# Patient Record
Sex: FEMALE | Race: WHITE | Employment: FULL TIME | URBAN - METROPOLITAN AREA
[De-identification: names, ages, dates, MRNs, and addresses within clinical notes are randomized per-mention and may not be internally consistent; named-entity substitution may affect disease eponyms.]

---

## 2018-05-24 ENCOUNTER — TRANSFERRED RECORDS (OUTPATIENT)
Dept: HEALTH INFORMATION MANAGEMENT | Facility: CLINIC | Age: 63
End: 2018-05-24

## 2019-07-06 ENCOUNTER — HOSPITAL ENCOUNTER (EMERGENCY)
Facility: CLINIC | Age: 64
Discharge: HOME | End: 2019-07-06
Attending: EMERGENCY MEDICINE | Admitting: EMERGENCY MEDICINE
Payer: COMMERCIAL

## 2019-07-06 VITALS
TEMPERATURE: 98.4 F | HEIGHT: 67 IN | HEART RATE: 73 BPM | SYSTOLIC BLOOD PRESSURE: 155 MMHG | RESPIRATION RATE: 16 BRPM | OXYGEN SATURATION: 99 % | WEIGHT: 180 LBS | BODY MASS INDEX: 28.25 KG/M2 | DIASTOLIC BLOOD PRESSURE: 124 MMHG

## 2019-07-06 DIAGNOSIS — T78.40XA ALLERGIC REACTION, INITIAL ENCOUNTER: ICD-10-CM

## 2019-07-06 DIAGNOSIS — L03.113 CELLULITIS OF RIGHT UPPER EXTREMITY: ICD-10-CM

## 2019-07-06 PROCEDURE — 99284 EMERGENCY DEPT VISIT MOD MDM: CPT | Mod: 25

## 2019-07-06 PROCEDURE — 96374 THER/PROPH/DIAG INJ IV PUSH: CPT

## 2019-07-06 PROCEDURE — 25000132 ZZH RX MED GY IP 250 OP 250 PS 637: Performed by: EMERGENCY MEDICINE

## 2019-07-06 PROCEDURE — 25000128 H RX IP 250 OP 636: Performed by: EMERGENCY MEDICINE

## 2019-07-06 RX ORDER — CEPHALEXIN 500 MG/1
500 CAPSULE ORAL 4 TIMES DAILY
Qty: 28 CAPSULE | Refills: 0 | Status: SHIPPED | OUTPATIENT
Start: 2019-07-06 | End: 2019-09-04

## 2019-07-06 RX ORDER — FAMOTIDINE 20 MG/1
20 TABLET, FILM COATED ORAL 2 TIMES DAILY PRN
Qty: 20 TABLET | Refills: 0 | Status: SHIPPED | OUTPATIENT
Start: 2019-07-06 | End: 2019-09-04

## 2019-07-06 RX ORDER — DEXAMETHASONE SODIUM PHOSPHATE 10 MG/ML
10 INJECTION, SOLUTION INTRAMUSCULAR; INTRAVENOUS ONCE
Status: COMPLETED | OUTPATIENT
Start: 2019-07-06 | End: 2019-07-06

## 2019-07-06 RX ORDER — DIPHENHYDRAMINE HCL 25 MG
50 CAPSULE ORAL ONCE
Status: COMPLETED | OUTPATIENT
Start: 2019-07-06 | End: 2019-07-06

## 2019-07-06 RX ORDER — FAMOTIDINE 20 MG/1
20 TABLET, FILM COATED ORAL ONCE
Status: COMPLETED | OUTPATIENT
Start: 2019-07-06 | End: 2019-07-06

## 2019-07-06 RX ORDER — CEPHALEXIN 500 MG/1
500 CAPSULE ORAL ONCE
Status: COMPLETED | OUTPATIENT
Start: 2019-07-06 | End: 2019-07-06

## 2019-07-06 RX ADMIN — DIPHENHYDRAMINE HYDROCHLORIDE 50 MG: 25 CAPSULE ORAL at 22:37

## 2019-07-06 RX ADMIN — FAMOTIDINE 20 MG: 20 TABLET, FILM COATED ORAL at 23:18

## 2019-07-06 RX ADMIN — DEXAMETHASONE SODIUM PHOSPHATE 10 MG: 10 INJECTION, SOLUTION INTRAMUSCULAR; INTRAVENOUS at 22:43

## 2019-07-06 RX ADMIN — CEPHALEXIN 500 MG: 500 CAPSULE ORAL at 22:37

## 2019-07-06 ASSESSMENT — ENCOUNTER SYMPTOMS
MYALGIAS: 1
FEVER: 0
WOUND: 1
COLOR CHANGE: 1
JOINT SWELLING: 1

## 2019-07-06 ASSESSMENT — MIFFLIN-ST. JEOR: SCORE: 1409.1

## 2019-07-06 NOTE — ED AVS SNAPSHOT
Emergency Department  [address] [facility] [address]  Phone:  [phone]  Fax:  [phone]                                    Stephanie Jung   MRN: [id number]    Department:   Emergency Department   Date of Visit:  7/6/2019           After Visit Summary Signature Page    I have received my discharge instructions, and my questions have been answered. I have discussed any challenges I see with this plan with the nurse or doctor.    ..........................................................................................................................................  Patient/Patient Representative Signature      ..........................................................................................................................................  Patient Representative Print Name and Relationship to Patient    ..................................................               ................................................  Date                                   Time    ..........................................................................................................................................  Reviewed by Signature/Title    ...................................................              ..............................................  Date                                               Time          22EPIC Rev 08/18

## 2019-07-07 NOTE — ED TRIAGE NOTES
Patient got stung by a wasp yesterday on right arm several times. States bite is increased in size and is draining. Redness has grown through out the day. States arm is tingly and feels hot.

## 2019-07-07 NOTE — ED PROVIDER NOTES
"  History     Chief Complaint:  Wasp sting    HPI   Stephanie Jung is a 63 year old female who presents to the emergency department for evaluation of multiple wasp sting. The patient indicates she was cleaning dead leaves out of a hedge yesterday when she was stung multiple times on the inside of her upper right arm by wasps. Since the incident her arm has become increasingly swollen, turned red and the redness is progressing up her arm, and is hot to touch. She also notes the sting area is painful and itchy, and she has experienced tingling and soreness in her left arm. The patient notes there has been clear fluid leaking from the wound. She denies fever since the incident. The patient indicates she did not see a stinger in the wound. She notes she has been icing and elevating and took a Claritin D this afternoon with no improvement of symptoms.     Allergies:  No known drug allergies     Medications:    The patient is not currently taking any prescribed medications.     Past Medical History:    History reviewed. The patient does not have any past pertinent medical history.    Past Surgical History:    CLOTILDE and BSO    Family History:    Heart disease  Breast cancer    Social History:  Smoking status: Never  Alcohol use: Negative  Patient presents with .  Marital Status:        Review of Systems   Constitutional: Negative for fever.   Musculoskeletal: Positive for joint swelling and myalgias.   Skin: Positive for color change and wound.   All other systems reviewed and are negative.      Physical Exam     Patient Vitals for the past 24 hrs:   BP Temp Temp src Pulse Resp SpO2 Height Weight   07/06/19 2218 (!) 155/124 98.4  F (36.9  C) Oral 73 16 99 % 1.702 m (5' 7\") 81.6 kg (180 lb)      Physical Exam   General: Patient in mild distress.  Alert and cooperative with exam. Normal mentation.  Well-appearing nontoxic  HEENT: NC/AT. Conjunctiva without injection or scleral icterus. External ears " normal.  Respiratory: Breathing comfortably on room air  CV: Normal rate, all extremities well perfused  GI:  Non-distended abdomen  Skin: Right upper extremity: CMS intact.  Tenderness, warmth, erythema to the arm as pictured below with 3 separate insect sting locations identified without stinger present.  Weeping from the medial elbow.  Compartments soft and compressible.  Musculoskeletal: No obvious deformities  Neuro: Alert, answers questions appropriately. No gross motor deficits              Emergency Department Course     Interventions:  2237 Benadryl 50 mg PO   Keflex 500 mg PO  2243 Decadron 10 mg IV  2318 Pepcid 20 mg PO    Emergency Department Course:  Nursing notes and vitals reviewed. 2038 I performed an exam of the patient as documented above.     2045 I performed  Bedside ultrasound.     IV inserted. Medicine administered as documented above.     2315 I rechecked the patient and discussed the results of her workup thus far.     Findings and plan explained to the Patient and spouse. Patient discharged home with instructions regarding supportive care, medications, and reasons to return. The importance of close follow-up was reviewed. The patient was prescribed Keflex and Pepcid.     Impression & Plan      Medical Decision Making:  Patient is a 63-year-old female who presents with right upper extremity pain and swelling as well as warmth and erythema following multiple wasp stings.  Patient's medical history and records were reviewed.  On evaluation presentation is consistent with infected insect bite.  As patient did note some associated itching and inflammatory changes consistent with allergic reaction, she was provided Decadron, Benadryl, and Pepcid in the ED.  Cellulitic area was outlined, and she was initiated on Keflex; first dose provided in the ED.  No indication for further labs or imaging at this time, though informal bedside ultrasound was performed and shows no evidence of abscess.  Did  recommend continued use of Benadryl and Pepcid as well as cold compresses.  Patient will follow-up with PCP in 2 days for reevaluation.  Return precautions discussed.  Patient discharged home.    Diagnosis:    ICD-10-CM    1. Cellulitis of right upper extremity L03.113    2. Allergic reaction, initial encounter T78.40XA      Disposition:  Discharged to home.    Discharge Medications:     Medication List      Started    cephALEXin 500 MG capsule  Commonly known as:  KEFLEX  500 mg, Oral, 4 TIMES DAILY     famotidine 20 MG tablet  Commonly known as:  PEPCID  20 mg, Oral, 2 TIMES DAILY PRN          Keerthi CASTANO, mike serving as a scribe on 7/6/2019 at 10:26 PM to personally document services performed by Luis Hogan DO based on my observations and the provider's statements to me.      Cruzito Downs  7/6/2019    EMERGENCY DEPARTMENT       Luis Hogan DO  07/07/19 1157

## 2019-09-04 ENCOUNTER — APPOINTMENT (OUTPATIENT)
Dept: GENERAL RADIOLOGY | Facility: CLINIC | Age: 64
End: 2019-09-04
Attending: NURSE PRACTITIONER
Payer: COMMERCIAL

## 2019-09-04 ENCOUNTER — HOSPITAL ENCOUNTER (OUTPATIENT)
Facility: CLINIC | Age: 64
Setting detail: OBSERVATION
Discharge: HOME | End: 2019-09-05
Attending: NURSE PRACTITIONER | Admitting: INTERNAL MEDICINE
Payer: COMMERCIAL

## 2019-09-04 DIAGNOSIS — R07.9 CHEST PAIN: ICD-10-CM

## 2019-09-04 LAB
ANION GAP SERPL CALCULATED.3IONS-SCNC: 7 MMOL/L (ref 3–14)
BASOPHILS # BLD AUTO: 0 10E9/L (ref 0–0.2)
BASOPHILS NFR BLD AUTO: 0.4 %
BUN SERPL-MCNC: 19 MG/DL (ref 7–30)
CALCIUM SERPL-MCNC: 8.3 MG/DL (ref 8.5–10.1)
CHLORIDE SERPL-SCNC: 105 MMOL/L (ref 94–109)
CO2 SERPL-SCNC: 27 MMOL/L (ref 20–32)
CREAT SERPL-MCNC: 1.21 MG/DL (ref 0.52–1.04)
D DIMER PPP FEU-MCNC: 0.3 UG/ML FEU (ref 0–0.5)
DIFFERENTIAL METHOD BLD: NORMAL
EOSINOPHIL # BLD AUTO: 0.1 10E9/L (ref 0–0.7)
EOSINOPHIL NFR BLD AUTO: 1.9 %
ERYTHROCYTE [DISTWIDTH] IN BLOOD BY AUTOMATED COUNT: 12.8 % (ref 10–15)
GFR SERPL CREATININE-BSD FRML MDRD: 48 ML/MIN/{1.73_M2}
GLUCOSE SERPL-MCNC: 92 MG/DL (ref 70–99)
HCT VFR BLD AUTO: 39.1 % (ref 35–47)
HGB BLD-MCNC: 13.1 G/DL (ref 11.7–15.7)
IMM GRANULOCYTES # BLD: 0 10E9/L (ref 0–0.4)
IMM GRANULOCYTES NFR BLD: 0.4 %
INTERPRETATION ECG - MUSE: NORMAL
LYMPHOCYTES # BLD AUTO: 2.4 10E9/L (ref 0.8–5.3)
LYMPHOCYTES NFR BLD AUTO: 34.5 %
MCH RBC QN AUTO: 30.8 PG (ref 26.5–33)
MCHC RBC AUTO-ENTMCNC: 33.5 G/DL (ref 31.5–36.5)
MCV RBC AUTO: 92 FL (ref 78–100)
MONOCYTES # BLD AUTO: 0.6 10E9/L (ref 0–1.3)
MONOCYTES NFR BLD AUTO: 8 %
NEUTROPHILS # BLD AUTO: 3.8 10E9/L (ref 1.6–8.3)
NEUTROPHILS NFR BLD AUTO: 54.8 %
NRBC # BLD AUTO: 0 10*3/UL
NRBC BLD AUTO-RTO: 0 /100
PLATELET # BLD AUTO: 228 10E9/L (ref 150–450)
POTASSIUM SERPL-SCNC: 3.9 MMOL/L (ref 3.4–5.3)
RBC # BLD AUTO: 4.25 10E12/L (ref 3.8–5.2)
SODIUM SERPL-SCNC: 139 MMOL/L (ref 133–144)
TROPONIN I SERPL-MCNC: <0.015 UG/L (ref 0–0.04)
WBC # BLD AUTO: 7 10E9/L (ref 4–11)

## 2019-09-04 PROCEDURE — 99285 EMERGENCY DEPT VISIT HI MDM: CPT | Mod: 25

## 2019-09-04 PROCEDURE — 80048 BASIC METABOLIC PNL TOTAL CA: CPT | Performed by: EMERGENCY MEDICINE

## 2019-09-04 PROCEDURE — 25000132 ZZH RX MED GY IP 250 OP 250 PS 637: Performed by: NURSE PRACTITIONER

## 2019-09-04 PROCEDURE — 36415 COLL VENOUS BLD VENIPUNCTURE: CPT | Performed by: INTERNAL MEDICINE

## 2019-09-04 PROCEDURE — 85379 FIBRIN DEGRADATION QUANT: CPT | Performed by: EMERGENCY MEDICINE

## 2019-09-04 PROCEDURE — 71046 X-RAY EXAM CHEST 2 VIEWS: CPT

## 2019-09-04 PROCEDURE — 85025 COMPLETE CBC W/AUTO DIFF WBC: CPT | Performed by: EMERGENCY MEDICINE

## 2019-09-04 PROCEDURE — 93005 ELECTROCARDIOGRAM TRACING: CPT

## 2019-09-04 PROCEDURE — 84484 ASSAY OF TROPONIN QUANT: CPT | Performed by: EMERGENCY MEDICINE

## 2019-09-04 PROCEDURE — G0378 HOSPITAL OBSERVATION PER HR: HCPCS

## 2019-09-04 PROCEDURE — 84484 ASSAY OF TROPONIN QUANT: CPT | Performed by: INTERNAL MEDICINE

## 2019-09-04 PROCEDURE — 99220 ZZC INITIAL OBSERVATION CARE,LEVL III: CPT | Performed by: INTERNAL MEDICINE

## 2019-09-04 RX ORDER — ACETAMINOPHEN 650 MG/1
650 SUPPOSITORY RECTAL EVERY 4 HOURS PRN
Status: DISCONTINUED | OUTPATIENT
Start: 2019-09-04 | End: 2019-09-05 | Stop reason: HOSPADM

## 2019-09-04 RX ORDER — PROCHLORPERAZINE MALEATE 10 MG
10 TABLET ORAL EVERY 6 HOURS PRN
Status: DISCONTINUED | OUTPATIENT
Start: 2019-09-04 | End: 2019-09-05 | Stop reason: HOSPADM

## 2019-09-04 RX ORDER — ASPIRIN 81 MG/1
324 TABLET, CHEWABLE ORAL ONCE
Status: COMPLETED | OUTPATIENT
Start: 2019-09-04 | End: 2019-09-04

## 2019-09-04 RX ORDER — AMOXICILLIN 250 MG
2 CAPSULE ORAL 2 TIMES DAILY PRN
Status: DISCONTINUED | OUTPATIENT
Start: 2019-09-04 | End: 2019-09-05 | Stop reason: HOSPADM

## 2019-09-04 RX ORDER — LOSARTAN POTASSIUM 50 MG/1
100 TABLET ORAL AT BEDTIME
COMMUNITY

## 2019-09-04 RX ORDER — LABETALOL HYDROCHLORIDE 5 MG/ML
10 INJECTION, SOLUTION INTRAVENOUS EVERY 6 HOURS PRN
Status: DISCONTINUED | OUTPATIENT
Start: 2019-09-04 | End: 2019-09-05 | Stop reason: HOSPADM

## 2019-09-04 RX ORDER — ACETAMINOPHEN 325 MG/1
650 TABLET ORAL EVERY 4 HOURS PRN
Status: DISCONTINUED | OUTPATIENT
Start: 2019-09-04 | End: 2019-09-05 | Stop reason: HOSPADM

## 2019-09-04 RX ORDER — ONDANSETRON 4 MG/1
4 TABLET, ORALLY DISINTEGRATING ORAL EVERY 6 HOURS PRN
Status: DISCONTINUED | OUTPATIENT
Start: 2019-09-04 | End: 2019-09-05 | Stop reason: HOSPADM

## 2019-09-04 RX ORDER — ASPIRIN 81 MG/1
81 TABLET ORAL DAILY
Status: DISCONTINUED | OUTPATIENT
Start: 2019-09-05 | End: 2019-09-05 | Stop reason: HOSPADM

## 2019-09-04 RX ORDER — MULTIPLE VITAMINS W/ MINERALS TAB 9MG-400MCG
1 TAB ORAL DAILY
COMMUNITY

## 2019-09-04 RX ORDER — SODIUM CHLORIDE 9 MG/ML
INJECTION, SOLUTION INTRAVENOUS CONTINUOUS
Status: ACTIVE | OUTPATIENT
Start: 2019-09-04 | End: 2019-09-05

## 2019-09-04 RX ORDER — NITROGLYCERIN 0.4 MG/1
0.4 TABLET SUBLINGUAL
Status: DISCONTINUED | OUTPATIENT
Start: 2019-09-04 | End: 2019-09-05 | Stop reason: HOSPADM

## 2019-09-04 RX ORDER — NALOXONE HYDROCHLORIDE 0.4 MG/ML
.1-.4 INJECTION, SOLUTION INTRAMUSCULAR; INTRAVENOUS; SUBCUTANEOUS
Status: DISCONTINUED | OUTPATIENT
Start: 2019-09-04 | End: 2019-09-05 | Stop reason: HOSPADM

## 2019-09-04 RX ORDER — NITROGLYCERIN 0.4 MG/1
0.4 TABLET SUBLINGUAL EVERY 5 MIN PRN
Status: DISCONTINUED | OUTPATIENT
Start: 2019-09-04 | End: 2019-09-05 | Stop reason: HOSPADM

## 2019-09-04 RX ORDER — ONDANSETRON 2 MG/ML
4 INJECTION INTRAMUSCULAR; INTRAVENOUS EVERY 6 HOURS PRN
Status: DISCONTINUED | OUTPATIENT
Start: 2019-09-04 | End: 2019-09-05 | Stop reason: HOSPADM

## 2019-09-04 RX ORDER — PROCHLORPERAZINE 25 MG
25 SUPPOSITORY, RECTAL RECTAL EVERY 12 HOURS PRN
Status: DISCONTINUED | OUTPATIENT
Start: 2019-09-04 | End: 2019-09-05 | Stop reason: HOSPADM

## 2019-09-04 RX ORDER — POLYETHYLENE GLYCOL 3350 17 G/17G
17 POWDER, FOR SOLUTION ORAL DAILY PRN
Status: DISCONTINUED | OUTPATIENT
Start: 2019-09-04 | End: 2019-09-05 | Stop reason: HOSPADM

## 2019-09-04 RX ORDER — HYDRALAZINE HYDROCHLORIDE 20 MG/ML
10 INJECTION INTRAMUSCULAR; INTRAVENOUS EVERY 6 HOURS PRN
Status: DISCONTINUED | OUTPATIENT
Start: 2019-09-04 | End: 2019-09-05 | Stop reason: HOSPADM

## 2019-09-04 RX ORDER — LIDOCAINE 40 MG/G
CREAM TOPICAL
Status: DISCONTINUED | OUTPATIENT
Start: 2019-09-04 | End: 2019-09-05 | Stop reason: HOSPADM

## 2019-09-04 RX ORDER — CHLORAL HYDRATE 500 MG
1 CAPSULE ORAL DAILY
COMMUNITY

## 2019-09-04 RX ORDER — AMOXICILLIN 250 MG
1 CAPSULE ORAL 2 TIMES DAILY PRN
Status: DISCONTINUED | OUTPATIENT
Start: 2019-09-04 | End: 2019-09-05 | Stop reason: HOSPADM

## 2019-09-04 RX ORDER — METOPROLOL TARTRATE 25 MG/1
50-100 TABLET, FILM COATED ORAL
Status: CANCELLED | OUTPATIENT
Start: 2019-09-04

## 2019-09-04 RX ADMIN — ASPIRIN 81 MG 324 MG: 81 TABLET ORAL at 19:25

## 2019-09-04 ASSESSMENT — ENCOUNTER SYMPTOMS
NAUSEA: 0
DIAPHORESIS: 1
VOMITING: 0
LIGHT-HEADEDNESS: 1

## 2019-09-05 ENCOUNTER — APPOINTMENT (OUTPATIENT)
Dept: CARDIOLOGY | Facility: CLINIC | Age: 64
End: 2019-09-05
Attending: INTERNAL MEDICINE
Payer: COMMERCIAL

## 2019-09-05 VITALS
TEMPERATURE: 96.8 F | OXYGEN SATURATION: 96 % | HEART RATE: 66 BPM | SYSTOLIC BLOOD PRESSURE: 135 MMHG | RESPIRATION RATE: 16 BRPM | BODY MASS INDEX: 29.05 KG/M2 | DIASTOLIC BLOOD PRESSURE: 69 MMHG | HEIGHT: 66 IN

## 2019-09-05 LAB
ANION GAP SERPL CALCULATED.3IONS-SCNC: 1 MMOL/L (ref 3–14)
BASOPHILS # BLD AUTO: 0 10E9/L (ref 0–0.2)
BASOPHILS NFR BLD AUTO: 0.3 %
BUN SERPL-MCNC: 16 MG/DL (ref 7–30)
CALCIUM SERPL-MCNC: 8.2 MG/DL (ref 8.5–10.1)
CHLORIDE SERPL-SCNC: 109 MMOL/L (ref 94–109)
CHOLEST SERPL-MCNC: 183 MG/DL
CO2 SERPL-SCNC: 30 MMOL/L (ref 20–32)
CREAT SERPL-MCNC: 1.03 MG/DL (ref 0.52–1.04)
DIFFERENTIAL METHOD BLD: NORMAL
EOSINOPHIL # BLD AUTO: 0.1 10E9/L (ref 0–0.7)
EOSINOPHIL NFR BLD AUTO: 2.4 %
ERYTHROCYTE [DISTWIDTH] IN BLOOD BY AUTOMATED COUNT: 12.7 % (ref 10–15)
GFR SERPL CREATININE-BSD FRML MDRD: 58 ML/MIN/{1.73_M2}
GLUCOSE SERPL-MCNC: 95 MG/DL (ref 70–99)
HCT VFR BLD AUTO: 36.5 % (ref 35–47)
HDLC SERPL-MCNC: 67 MG/DL
HGB BLD-MCNC: 12.1 G/DL (ref 11.7–15.7)
IMM GRANULOCYTES # BLD: 0 10E9/L (ref 0–0.4)
IMM GRANULOCYTES NFR BLD: 0.2 %
LDLC SERPL CALC-MCNC: 98 MG/DL
LYMPHOCYTES # BLD AUTO: 2.2 10E9/L (ref 0.8–5.3)
LYMPHOCYTES NFR BLD AUTO: 38.3 %
MCH RBC QN AUTO: 30.6 PG (ref 26.5–33)
MCHC RBC AUTO-ENTMCNC: 33.2 G/DL (ref 31.5–36.5)
MCV RBC AUTO: 92 FL (ref 78–100)
MONOCYTES # BLD AUTO: 0.5 10E9/L (ref 0–1.3)
MONOCYTES NFR BLD AUTO: 8 %
NEUTROPHILS # BLD AUTO: 2.9 10E9/L (ref 1.6–8.3)
NEUTROPHILS NFR BLD AUTO: 50.8 %
NONHDLC SERPL-MCNC: 116 MG/DL
NRBC # BLD AUTO: 0 10*3/UL
NRBC BLD AUTO-RTO: 0 /100
PLATELET # BLD AUTO: 197 10E9/L (ref 150–450)
POTASSIUM SERPL-SCNC: 4 MMOL/L (ref 3.4–5.3)
RBC # BLD AUTO: 3.96 10E12/L (ref 3.8–5.2)
SODIUM SERPL-SCNC: 140 MMOL/L (ref 133–144)
TRIGL SERPL-MCNC: 89 MG/DL
TROPONIN I SERPL-MCNC: <0.015 UG/L (ref 0–0.04)
TROPONIN I SERPL-MCNC: <0.015 UG/L (ref 0–0.04)
WBC # BLD AUTO: 5.7 10E9/L (ref 4–11)

## 2019-09-05 PROCEDURE — G0378 HOSPITAL OBSERVATION PER HR: HCPCS

## 2019-09-05 PROCEDURE — 25800030 ZZH RX IP 258 OP 636: Performed by: INTERNAL MEDICINE

## 2019-09-05 PROCEDURE — 84484 ASSAY OF TROPONIN QUANT: CPT | Performed by: INTERNAL MEDICINE

## 2019-09-05 PROCEDURE — 93321 DOPPLER ECHO F-UP/LMTD STD: CPT | Mod: 26 | Performed by: INTERNAL MEDICINE

## 2019-09-05 PROCEDURE — 93016 CV STRESS TEST SUPVJ ONLY: CPT | Performed by: INTERNAL MEDICINE

## 2019-09-05 PROCEDURE — 25500064 ZZH RX 255 OP 636: Performed by: INTERNAL MEDICINE

## 2019-09-05 PROCEDURE — 85025 COMPLETE CBC W/AUTO DIFF WBC: CPT | Performed by: INTERNAL MEDICINE

## 2019-09-05 PROCEDURE — 99217 ZZC OBSERVATION CARE DISCHARGE: CPT | Performed by: HOSPITALIST

## 2019-09-05 PROCEDURE — 93350 STRESS TTE ONLY: CPT | Mod: 26 | Performed by: INTERNAL MEDICINE

## 2019-09-05 PROCEDURE — 25000132 ZZH RX MED GY IP 250 OP 250 PS 637: Performed by: INTERNAL MEDICINE

## 2019-09-05 PROCEDURE — 36415 COLL VENOUS BLD VENIPUNCTURE: CPT | Performed by: INTERNAL MEDICINE

## 2019-09-05 PROCEDURE — 93018 CV STRESS TEST I&R ONLY: CPT | Performed by: INTERNAL MEDICINE

## 2019-09-05 PROCEDURE — 93325 DOPPLER ECHO COLOR FLOW MAPG: CPT | Mod: 26 | Performed by: INTERNAL MEDICINE

## 2019-09-05 PROCEDURE — 80061 LIPID PANEL: CPT | Performed by: INTERNAL MEDICINE

## 2019-09-05 PROCEDURE — 40000264 ECHO STRESS ECHOCARDIOGRAM

## 2019-09-05 PROCEDURE — 80048 BASIC METABOLIC PNL TOTAL CA: CPT | Performed by: INTERNAL MEDICINE

## 2019-09-05 RX ADMIN — ASPIRIN 81 MG: 81 TABLET, DELAYED RELEASE ORAL at 09:25

## 2019-09-05 RX ADMIN — HUMAN ALBUMIN MICROSPHERES AND PERFLUTREN 5 ML: 10; .22 INJECTION, SOLUTION INTRAVENOUS at 09:11

## 2019-09-05 RX ADMIN — SODIUM CHLORIDE: 9 INJECTION, SOLUTION INTRAVENOUS at 00:43

## 2019-09-05 NOTE — PROGRESS NOTES
PRIMARY DIAGNOSIS: CHEST PAIN  OUTPATIENT/OBSERVATION GOALS TO BE MET BEFORE DISCHARGE:  1. Ruled out acute coronary syndrome (negative or stable Troponin):  yes  2. Pain Status: Pain free.  3. Appropriate provocative testing performed: yes  - Stress Test Procedure: Echo  - Interpretation of cardiac rhythm per telemetry tech: NSR     4. Cleared by Consultants (if applicable):yes  5. Return to near baseline physical activity: Yes  Discharge Planner Nurse   Safe discharge environment identified: yes  Barriers to discharge:

## 2019-09-05 NOTE — ED PROVIDER NOTES
History     Chief Complaint:  Chest Pain     HPI   Stephanie Jung is a 63 year old female with history of recent hypertension who presents to the emergency department today for evaluation of chest pain. The patient reports that for the past couple weeks has experienced high blood pressure and has been in recent contact with her primary care provider. She notes that her physician put her on a high blood pressure readings over the past weekend that she just turned in yesterday morning, and hasn't gotten the results back yet. She started Losartan approximately 2 weeks ago.     Here, the patient reports of spontaneous, intermittent, sharp left chest pain since last night and throughout today at work, and typically lasts just a few seconds each. She notes that this could've been due to stress from work, but has never experienced this before. The patient has done nothing to relieve the pain. She also reports episodes of diaphoresis that accompanied some of the episodes. The patient also endorses slight light headedness. The patient denies having nausea or vomiting, or pain here in the ED.    CARDIAC RISK FACTORS:  Sex: Female                                                 Tobacco/Illicit drugs- Negative          Hypertension - Positive                                   Hyperlipidemia- Negative                      Diabetes- Negative                                          Family History- Positive - Father at late 60s had a heart attack followed by pacemaker.                       Personal History- Negative                                       PE/DVT RISK FACTORS:  Sex:  Female                                                   Hormones- Negative                                        Tobacco- Negative                                          Cancer- Negative                                             Travel- Negative                                              Surgery- Negative                                             Other immobilization- Negative              Personal history of PE/DVT- Negative                     Family history of PE/DVT- Negative                           Allergies:  No Known Drug Allergies     Medications:    Multivitamins    Past Medical History:    Hypertension    Past Surgical History:    Surgical history reviewed. No pertinent surgical history.     Family History:    Family history reviewed. No pertinent family history.     Social History:  The patient was not accompanied to the ED.  Smoking Status: Never Smoker  Smokeless Tobacco: Never Used  Alcohol Use: Positive  Drug Use: Negative  Marital Status:       Review of Systems   Constitutional: Positive for diaphoresis.   Cardiovascular: Positive for chest pain.   Gastrointestinal: Negative for nausea and vomiting.   Neurological: Positive for light-headedness.   All other systems reviewed and are negative.    Physical Exam     Patient Vitals for the past 24 hrs:   BP Temp Temp src Pulse Heart Rate Resp SpO2   09/04/19 2100 (!) 145/83 -- -- 71 70 8 95 %   09/04/19 2045 139/65 -- -- 69 69 8 98 %   09/04/19 2030 139/81 -- -- 67 73 24 97 %   09/04/19 2000 (!) 142/64 -- -- 70 68 10 95 %   09/04/19 1945 130/80 -- -- 71 74 15 98 %   09/04/19 1930 137/69 -- -- -- 76 19 --   09/04/19 1918 -- -- -- 78 -- -- --   09/04/19 1917 -- 97.9  F (36.6  C) Oral -- -- -- --   09/04/19 1904 (!) 157/102 -- -- -- -- 18 96 %      Physical Exam  Nursing notes reviewed. Vitals reviewed.  General: Alert. Well kept.  Eyes:  Conjunctiva non-injected, non-icteric.  Neck/Throat: Moist mucous membranes. Normal voice.  Cardiac: Regular rhythm. Normal heart sounds, without murmur or rub. No peripheral edema. 2+ radial pulses.  Pulmonary: Clear and equal breath sounds bilaterally.   Abdomen: soft and non-tender  Musculoskeletal: Normal gross range of motion of all 4 extremities.   Neurological: Alert and oriented x4.   Skin: Warm and dry. Normal appearance of visualized  exposed skin without rashes or petechiae.  Psych: Affect normal. Good eye contact.    Emergency Department Course     ECG:  ECG taken at 1858, ECG read at 1909  Normal sinus rhythm with sinus arrhythmia  Nonspecific ST abnormality  Abnormal ECG  STD V4-6 is New  Rate 76 bpm. LA interval 124 ms. QRS duration 74 ms. QT/QTc 358/402 ms. P-R-T axes 66 71 116.    Imaging:  Radiology findings were communicated with the patient who voiced understanding of the findings.    XR Chest 2 Views   Heart and pulmonary vessels within normal limits. Lungs clear. No  pleural effusion.  JUAN PABLO FOLEY MD  Reading per radiology     Laboratory:  Laboratory findings were communicated with the patient who voiced understanding of the findings.    CBC: WBC 7.0, HGB 13.1,   BMP: Creatinine 1.21 (H), GFR Estimate 48 (L), Calcium 8.3 (L) o/w WNL  Troponin (Collected 1913): <0.015   D Dimer quantitative: 0.3    Interventions:  1925 Aspirin 324 mg PO    Emergency Department Course:    1859 Nursing notes and vitals reviewed. EKG obtained as noted above.     1900 I discussed the patient in shared service with Dr. Hogan    1907 I performed an exam of the patient as documented above.     1913 IV was inserted and blood was drawn for laboratory testing, results above.     1926 The patient was sent for a X-Ray chest while in the emergency department, results above.      2037 Patient rechecked and updated.      2051 I spoke with Dr. Melvin of the hospitalist service from [facility] [facility] regarding patient's presentation, findings, and plan of care.     I discussed the treatment plan with the patient. They expressed understanding of this plan and consented to admission. I discussed the patient with , who will admit the patient to a monitored bed for further evaluation and treatment.     Impression & Plan      Medical Decision Making:  Stephanie Jung is a 63 year old female who presents to the emergency department today for evaluation of acute  chest pain. Differential diagnoses includes acute coronary syndrome, myocardial infarction, pulmonary embolism, aortic dissection, pneumonia, pneumothorax, pericarditis, pleurisy, esophageal spasm, or referred pain. Initial EKG was concerning for ST depression in leads V4-V6. She has no current pain, was given aspirin, 324 mg. She is hemodynamically stable and initial troponin was negative. She is perc-negative with the exception of age, but with her pleuritic pain, a D-dimer was obtained and returned negative. Pulmonary embolism and aortic dissection are considered unlikely. Chest X-Ray shows no abnormalities and a normal mediastinum. Her initial HEART score is 5 and with concern for diaphoresis with her pain today, she is appropriate for observation admission for serial troponin and further evaluation. I discussed this with the patient and  and they agree with this plan. She has continued to be asymptomatic while in the ED. I spoke with Dr. Melvin who accepts the patient for admission.     HEART Score  Background  Calculates the overall risk of adverse event in patient's presenting with chest pain.  Based on 5 criteria (each assigned 0-2 points) including suspiciousness of history, EKG, age, risk factors and troponin.    Data  63 year old female  does not have a problem list on file.   reports that she has never smoked. She has never used smokeless tobacco.  family history is not on file.  No results found for: TROPI  Criteria   0-2 points for each of 5 items (maximum of 10 points):  Score 1- History moderately suspicious for coronary syndrome  Score 2- EKG with Significant ST Depression  Score 1- Age 45 to 65 years old  Score 1- One to 2 risk factors for atherosclerotic disease  Score 0- Within normal limits for troponin levels  Interpretation  Risk of adverse outcome  Heart Score: 5  Total Score 4-6- Adverse Outcome Risk 20.3% - Supports admission with standard rule-out management -serial troponins and  stress testing    Diagnosis:    ICD-10-CM    1. Chest pain R07.9       Disposition:   The patient is admitted into the care of Dr. Melvin.     Scribe Disclosure:  I, Maykel Brown, am serving as a scribe at 7:01 PM on 9/4/2019 to document services personally performed by Cortney Amaya CNP based on my observations and the provider's statements to me.      EMERGENCY DEPARTMENT       Cortney Amaya CNP  09/04/19 2115

## 2019-09-05 NOTE — PLAN OF CARE
PRIMARY DIAGNOSIS: CHEST PAIN  OUTPATIENT/OBSERVATION GOALS TO BE MET BEFORE DISCHARGE:  1. Ruled out acute coronary syndrome (negative or stable Troponin):  No  2. Pain Status: Pain free.  3. Appropriate provocative testing performed: No  - Stress Test Procedure: Echo  - Interpretation of cardiac rhythm per telemetry tech: NSR    4. Cleared by Consultants (if applicable):No  5. Return to near baseline physical activity: Yes  Discharge Planner Nurse   Safe discharge environment identified: No  Barriers to discharge: Yes       Entered by: Herminia Ross 09/05/2019 2:22 AM     Please review provider order for any additional goals.   Nurse to notify provider when observation goals have been met and patient is ready for discharge.

## 2019-09-05 NOTE — PLAN OF CARE
A&Ox4, VSS on RA. Denies pain at this time. Tele NSR. Up ad megan. Clear liq no caffeine diet, voiding adequately. Trops neg x3.  IV SL. Plan for exercise stress test in AM.

## 2019-09-05 NOTE — PROGRESS NOTES
Pt a&o, up indep, denies pain.  Pt admit profile completed.  Iv intact.  Pt skin looks good.  Pt NPO for tests in am.  Continue to monitor and POC

## 2019-09-05 NOTE — PLAN OF CARE
PRIMARY DIAGNOSIS: CHEST PAIN  OUTPATIENT/OBSERVATION GOALS TO BE MET BEFORE DISCHARGE:  1. Ruled out acute coronary syndrome (negative or stable Troponin):  No  2. Pain Status: Pain free.  3. Appropriate provocative testing performed: No  - Stress Test Procedure: Echo  - Interpretation of cardiac rhythm per telemetry tech: NSR    4. Cleared by Consultants (if applicable):No  5. Return to near baseline physical activity: Yes  Discharge Planner Nurse   Safe discharge environment identified: No  Barriers to discharge: Yes       Entered by: Herminia Ross 09/05/2019 6:30 AM     Please review provider order for any additional goals.   Nurse to notify provider when observation goals have been met and patient is ready for discharge.

## 2019-09-05 NOTE — PROGRESS NOTES
PRIMARY DIAGNOSIS: CHEST PAIN  OUTPATIENT/OBSERVATION GOALS TO BE MET BEFORE DISCHARGE:  1. Ruled out acute coronary syndrome (negative or stable Troponin):  No  2. Pain Status: Pain free.  3. Appropriate provocative testing performed: No  - Stress Test Procedure: Echo  - Interpretation of cardiac rhythm per telemetry tech: NSR     4. Cleared by Consultants (if applicable):No  5. Return to near baseline physical activity: Yes  Discharge Planner Nurse   Safe discharge environment identified: No  Barriers to discharge:

## 2019-09-05 NOTE — PHARMACY-ADMISSION MEDICATION HISTORY
Admission medication history interview status for the 9/4/2019  admission is complete. See EPIC admission navigator for prior to admission medications     Medication history source reliability:Good    Actions taken by pharmacist (provider contacted, etc):None     Additional medication history information not noted on PTA med list :None    Medication reconciliation/reorder completed by provider prior to medication history? No    Time spent in this activity: 10 min    Prior to Admission medications    Medication Sig Last Dose Taking? Auth Provider   fish oil-omega-3 fatty acids 1000 MG capsule Take 1 g by mouth daily 9/4/2019 at Unknown time Yes Unknown, Entered By History   losartan (COZAAR) 50 MG tablet Take 50 mg by mouth At Bedtime 9/3/2019 at Unknown time Yes Unknown, Entered By History   multivitamin w/minerals (THERA-VIT-M) tablet Take 1 tablet by mouth daily 9/4/2019 at Unknown time Yes Unknown, Entered By History

## 2019-09-05 NOTE — DISCHARGE SUMMARY
[facility]    Discharge Summary  Hospitalist    Date of Admission:  9/4/2019  Date of Discharge:  9/5/2019  Discharging Provider: Mark Bynum  Date of Service (when I saw the patient): 09/05/19    History of Present Illness   Ms. Stephanie Jung is a 63-year-old female patient with history noted below, including hypertension, who presents with the above acute complaints.  The patient notes that she started having left-sided chest discomfort since last evening, which started intermittently throughout the day yesterday.  Symptoms continued throughout work today and noted that symptoms would come and go every few seconds.  She says that it was 2 to 3/10 in severity.  Noted some worsening when she was walking.  Does note some associated dyspnea.  She also had a diaphoretic episode.  Overall, given her symptomatology, as she was on the way home, she decided to present to [facility] for further evaluation.      Patient was seen in the ER and vitals showed temperature showed temperature 97.9, heart rate 78, blood pressure 157/102, respiratory rate 18, O2 saturation 96%.  She had laboratory evaluation which was significant for negative troponin, negative D-dimer.  She had a BMP that did show that her creatinine was elevated at 1.21.  She had an EKG performed which showed sinus rhythm and some slight ST depression, about 1 mm in V6 and more subtly V4, as well as some nonspecific T-wave changes in some of the limb leads.  She had a chest x-ray performed, which did not show any acute findings.  Overall, given her symptomatology, request for admission was made.      The patient denies any fevers or chills.  No nausea or vomiting.  No abdominal pain, bloody stools or bloody stools.      Of note, the patient was started recently on Losartan 50 mg a day about 2 weeks ago for hypertension.  She has been tolerating it well.  She recently did have a continuous heart monitor that she just sent back to the  clinic for results.     Hospital Course   Stephanie Jung was admitted on 9/4/2019.  The following problems were addressed during her hospitalization:    Ms. Stephanie Jung is a 63-year-old female patient including hypertension, who presents with chest pain.      Chest pain. Some typical and atypical components in chest pain has been ongoing since last evening with no detectable troponin.  EKG showed some slight abnormalities in V6 and more nonspecific changes and some in the limb leads, some worsening with activity.    - serial troponins negative.     - exercise stress test echocardiogram completed, normal, no evidence of stress-induced ischemia, see report for further details.  - Close outpatient follow up.      Acute kidney injury, unclear etiology, question related to recent initiation of ARB.  The patient was recently started on losartan   - PTA Losartan continued.    - Resolved.      Benign essential hypertension.  The patient was recently started on losartan  - PTA Losartan.     Pending Results   These results will be followed up by PCP  Unresulted Labs Ordered in the Past 30 Days of this Admission     No orders found for last 31 day(s).          Code Status   Full Code       Primary Care Physician   Physician No Ref-Primary    Physical Exam   Temp: 98.2  F (36.8  C) Temp src: Axillary BP: 136/57 Pulse: 66 Heart Rate: 68 Resp: 16 SpO2: 99 % O2 Device: None (Room air)    There were no vitals filed for this visit.  Vital Signs with Ranges  Temp:  [96.7  F (35.9  C)-98.2  F (36.8  C)] 98.2  F (36.8  C)  Pulse:  [66-78] 66  Heart Rate:  [64-76] 68  Resp:  [7-24] 16  BP: (124-157)/() 136/57  SpO2:  [92 %-99 %] 99 %  No intake/output data recorded.    GENERAL: Alert and oriented. NAD. Conversational, appropriate.   HEENT: Normocephalic. EOMI. No icterus or injection. Nares normal.   LUNGS: Clear to auscultation. No dyspnea at rest.   HEART: Regular rate. Extremities perfused.   ABDOMEN: Soft, nontender,  and nondistended. Positive bowel sounds.   EXTREMITIES: No LE edema noted.   NEUROLOGIC: Moves extremities x4 on command. No acute focal neurologic abnormalities noted.     Discharge Disposition   Discharged to home  Condition at discharge: Stable    Consultations This Hospital Stay   SMOKING CESSATION PROGRAM IP CONSULT    Time Spent on this Encounter   IMark DO, personally saw the patient today and spent greater than 30 minutes discharging this patient.    Discharge Orders      Reason for your hospital stay    Chest discomfort     Follow-up and recommended labs and tests     Follow up with primary care provider, Physician No Ref-Primary, within 7 days for hospital follow- up.  The following labs/tests are recommended: cbc/bmp.     Activity    Your activity upon discharge: activity as tolerated     Full Code     Diet    Follow this diet upon discharge: Orders Placed This Encounter      Combination Diet Clear Liquid; No Caffeine Diet     Discharge Medications   Current Discharge Medication List      CONTINUE these medications which have NOT CHANGED    Details   fish oil-omega-3 fatty acids 1000 MG capsule Take 1 g by mouth daily      losartan (COZAAR) 50 MG tablet Take 50 mg by mouth At Bedtime      multivitamin w/minerals (THERA-VIT-M) tablet Take 1 tablet by mouth daily           Allergies   No Known Allergies  Data   Most Recent 3 CBC's:  Recent Labs   Lab Test 09/05/19  0551 09/04/19 1913   WBC 5.7 7.0   HGB 12.1 13.1   MCV 92 92    228      Most Recent 3 BMP's:  Recent Labs   Lab Test 09/05/19  0551 09/04/19 1913    139   POTASSIUM 4.0 3.9   CHLORIDE 109 105   CO2 30 27   BUN 16 19   CR 1.03 1.21*   ANIONGAP 1* 7   FABIANA 8.2* 8.3*   GLC 95 92     Most Recent 2 LFT's:No lab results found.  Most Recent INR's and Anticoagulation Dosing History:  Anticoagulation Dose History     There is no flowsheet data to display.        Most Recent 3 Troponin's:  Recent Labs   Lab Test  09/05/19  0551 09/04/19  2341 09/04/19  1913   TROPI <0.015 <0.015 <0.015     Most Recent Cholesterol Panel:  Recent Labs   Lab Test 09/05/19  0551   CHOL 183   LDL 98   HDL 67   TRIG 89     Most Recent 6 Bacteria Isolates From Any Culture (See EPIC Reports for Culture Details):No lab results found.  Most Recent TSH, T4 and A1c Labs:No lab results found.  Results for orders placed or performed during the hospital encounter of 09/04/19   XR Chest 2 Views    Narrative    CHEST TWO VIEWS  9/4/2019 7:32 PM     HISTORY: chest pain    COMPARISON: None.      Impression    IMPRESSION:   Heart and pulmonary vessels within normal limits. Lungs clear. No  pleural effusion.    JUAN PABLO FOLEY MD

## 2019-09-05 NOTE — H&P
Admitted:     09/04/2019      PRIMARY CARE PHYSICIAN:  Through [facility] in [place]      CHIEF COMPLAINT:  Chest pain.      HISTORY OF PRESENT ILLNESS:  Ms. Stephanie Jung is a 63-year-old female patient with history noted below, including hypertension, who presents with the above acute complaints.  The patient notes that she started having left-sided chest discomfort since last evening, which started intermittently throughout the day yesterday.  Symptoms continued throughout work today and noted that symptoms would come and go every few seconds.  She says that it was 2 to 3/10 in severity.  Noted some worsening when she was walking.  Does note some associated dyspnea.  She also had a diaphoretic episode.  Overall, given her symptomatology, as she was on the way home, she decided to present to [facility] for further evaluation.      Patient was seen in the ER and vitals showed temperature showed temperature 97.9, heart rate 78, blood pressure 157/102, respiratory rate 18, O2 saturation 96%.  She had laboratory evaluation which was significant for negative troponin, negative D-dimer.  She had a BMP that did show that her creatinine was elevated at 1.21.  She had an EKG performed which showed sinus rhythm and some slight ST depression, about 1 mm in V6 and more subtly V4, as well as some nonspecific T-wave changes in some of the limb leads.  She had a chest x-ray performed, which did not show any acute findings.  Overall, given her symptomatology, request for admission was made.      The patient denies any fevers or chills.  No nausea or vomiting.  No abdominal pain, bloody stools or bloody stools.      Of note, the patient was started recently on Losartan 50 mg a day about 2 weeks ago for hypertension.  She has been tolerating it well.  She recently did have a continuous heart monitor that she just sent back to the clinic for results.      PAST MEDICAL HISTORY:  Hypertension.      PAST SURGICAL  HISTORY:   Status post hysterectomy and bilateral salpingo-oophorectomy for prolapsed uterus about 10 years.      ALLERGIES:  NO KNOWN DRUG ALLERGIES.      HOME MEDICATIONS:  Losartan 50 mg a day.      SOCIAL HISTORY:  The patient does not smoke.  She does drink occasional alcohol.  She is .  She has 2 children.  Works at [employer] in [employer] [place].      FAMILY HISTORY:  Father had MI in his late 60s.  Otherwise, family history is reviewed and not felt to be contributory.      REVIEW OF SYSTEMS:  As in HPI, otherwise 10-point systems negative.      PHYSICAL EXAMINATION:   VITAL SIGNS:  Temperature 97.9, heart rate 70, blood pressure 145/83, respiratory rate 8, O2 saturations 95%.   General:  Alert, oriented, pleasant and conversant.  HEENT:  Head NCAT. PERRL. No scleral icterus or conjunctival injection. Oropharynx no erythema or exudate.  Neck:  No bruits, JVD.  Heart/Chest:  Regular rate and rhythm. No murmurs, rubs, or gallops.  Lungs:  Clear to auscultation bilaterally. No crackles/wheezes.  Abdomen:  Soft, non-tender, non-distended, with positive bowel sounds. No femoral bruits.  Extremities/Musculoskeletal:  No significant lower extremity edema. Palpable posterior tibial pulses bilaterally. No obvious skin rash. No knee or other joint swelling.  Neurologic:  No gross focal motor or sensory deficits.    LABS AND IMAGING:  Notable labs and imaging as above; otherwise, refer to chart.    ECG personally reviewed as described above.    ASSESSMENT AND PLAN:  Ms. Stephanie Jung is a 63-year-old female patient including hypertension, who presents with chest pain.     1. Chest pain. Some typical and atypical components in chest pain as has been ongoing since last evening with no detectable troponin but some exertional symptoms.  EKG showed some slight abnormalities in V6 and more nonspecific changes and some in the limb leads.  At this time, will rule out with serial troponins.  Order echocardiogram.   Monitor on telemetry.  Plan for CT coronary angiogram, but she does have acute kidney injury.  Therefore, we will be hydrating the patient with normal saline, which will be discussed below and if creatinine does not normalize, then probably change to exercise stress test.  If creatinine normalizes, then consider a CT coronary angiogram.    2. Acute kidney injury, unclear etiology, question related to recent initiation of ARB.  The patient was recently started on losartan 50 mg a day about 2 weeks ago.  At this time, will hold losartan for now.  Hydrate with normal saline 100 mL an hour for 10 hours.  Monitor BMP.  Avoid nephrotoxic medications.     3. Benign essential hypertension.  The patient was recently started on losartan 50 mg a day as above.  Hold losartan for now given acute kidney injury.  Will order p.r.n.  IV hydralazine and p.r.n. IV labetalol.  If renal function does not improve, then may need to consider alternative medication for blood pressure management such as amlodipine.     4. Prophylaxis.  Pneumoboots and ambulation.      CODE STATUS:  Full Code.         JAN FINNEY JR., MD        Part two #[id number]  2019  juan manuel        D: 2019   T: 2019   MT:       Name:     ANTWAN MOSS   MRN:      [id number]        Account:      [id number]   :      1955        Admitted:     2019                   Document: [id number]

## 2019-09-05 NOTE — ED PROVIDER NOTES
Emergency Department Attending Supervision Note  9/4/2019  7:08 PM    I evaluated this patient in conjunction with Cortney Amaya CNP    Briefly, the patient presented with five to six hours of intermittent sharp, shooting chest pain radiating into her back and left upper arm exacerbated with deep breathing and exertion with associated diaphoresis, light headedness, and mild shortness of breath. She denies nausea, swelling, recent illness, use of aspirin, or history of cardiac issues. Of note, the patient was started on losartan on 8/23/19 for hypertension.     Cardiac/PE/DVT Risk Factors:  History of hypertension - positive  History of smoking - negative  Personal history of PE/DVT - negative  Family history of heart complications - positive: father with MI in 60's followed by pacemaker  Recent travel - negative  Cancer - negative    BP (!) 145/83   Pulse 71   Temp 97.9  F (36.6  C) (Oral)   Resp 8   SpO2 95%   General: Alert and cooperative with exam. Patient in mild distress. Normal mentation.  Head:  Scalp is NC/AT  Eyes:  No scleral icterus, PERRL  ENT:  The external nose and ears are normal. The oropharynx is normal and without erythema; mucus membranes are moist.  Neck:  Normal range of motion without rigidity.  CV:  Regular rate and rhythm    No pathologic murmur   Resp:  Breath sounds are clear bilaterally    Non-labored, no retractions or accessory muscle use  GI:  Abdomen is soft, no distension, no tenderness. No peritoneal signs  MS:  No lower extremity edema   Skin:  Warm and dry, No rash or lesions noted.  Neuro: Oriented x 3. No gross motor deficits.    Results:    ECG:  ECG taken at 1858, ECG read at 1909  Normal sinus rhythm with sinus arrhythmia  Nonspecific ST abnormality   Abnormal ECG  Rate 76 bpm. WA interval 124 ms. QRS duration 74 ms. QT/QTc 358/402 ms. P-R-T axes 66 71 116.    XR Chest 2 Views   Final Result   IMPRESSION:    Heart and pulmonary vessels within normal limits. Lungs clear.  No   pleural effusion.      JUAN PABLO FOLEY MD        Labs Ordered and Resulted from Time of ED Arrival Up to the Time of Departure from the ED   BASIC METABOLIC PANEL - Abnormal; Notable for the following components:       Result Value    Creatinine 1.21 (*)     GFR Estimate 48 (*)     GFR Estimate If Black 55 (*)     Calcium 8.3 (*)     All other components within normal limits   CBC WITH PLATELETS DIFFERENTIAL   TROPONIN I   D DIMER QUANTITATIVE   PULSE OXIMETRY NURSING   CARDIAC CONTINUOUS MONITORING   PERIPHERAL IV CATHETER   PATIENT CARE ORDER     Patient is a 63-year-old female who presents with episodic chest pain and diaphoresis; now resolved.  Patient's medical history and records were reviewed.  Initial consideration for, but not limited to, ACS/MI, esophageal spasm/GERD, PE, MSK pain, infectious process, pericarditis, myocarditis, among others.  Labs, EKG, and imaging was obtained.  EKG demonstrates ST depression in the anterolateral leads which is new compared to previous.  Chest x-ray unremarkable.  Labs including troponin and d-dimer are without significant findings with exception of mild elevation in creatinine (1.2).  Patient was provided 324 mg aspirin.  She remained asymptomatic throughout her ED encounter.  Given chest pain and abnormal EKG patient will be admitted to chest pain observation unit for further evaluation and care with the hospitalist service as ACS is not excluded.    Diagnosis    ICD-10-CM    1. Chest pain R07.9        Scribe Disclosure:  Carmen CASTANO, am serving as a scribe at 7:09 PM on 9/4/2019 to document services personally performed by Luis Hogan DO based on my observations and the provider's statements to me.           Luis Hogan DO  09/05/19 0006

## 2019-09-05 NOTE — PROGRESS NOTES
INTERNAL MEDICINE UPDATE    Original plan was for CT coronary angiogram, but given JACKIE, will order exercise stress echo instead.    Gary Melvin Jr., MD  [phone] (p)  Text Page

## 2019-09-05 NOTE — ED NOTES
"[facility]  ED Nurse Handoff Report    ED Chief complaint: Chest Pain (chest pain off and on today)      ED Diagnosis:   Final diagnoses:   None       Code Status: Full Code    Allergies: No Known Allergies    Activity level - Baseline/Home:  Independent  Activity Level - Current:   Independent    Patient's Preferred language: English   Needed?: No    Isolation: Yes  Infection: Not Applicable  Bariatric?: No    Vital Signs:   Vitals:    09/04/19 1918 09/04/19 1930 09/04/19 1945 09/04/19 2000   BP:  137/69 130/80 (!) 142/64   Pulse: 78  71 70   Resp:  19 15 10   Temp:       TempSrc:       SpO2:   98% 95%       Cardiac Rhythm: ,        Pain level:      Is this patient confused?: No   Does this patient have a guardian?  No         If yes, is there guardianship documents in the Epic \"Code/ACP\" activity?  N/A         Guardian Notified?  N/A  Scott - Suicide Severity Rating Scale Completed?  Yes  If yes, what color did the patient score?  White    Patient Report: Initial Complaint: intermittent chest pain with an abnormal ekg  Focused Assessment: AOx3. AVSS. Patient denies chest pain of shortness of breath at present.  Tests Performed: labs, xray, ekg  Abnormal Results:   Abnormal Labs Reviewed   BASIC METABOLIC PANEL - Abnormal; Notable for the following components:       Result Value    Creatinine 1.21 (*)     GFR Estimate 48 (*)     GFR Estimate If Black 55 (*)     Calcium 8.3 (*)     All other components within normal limits       Treatments provided: aspirin po    Family Comments:  at bedside    OBS brochure/video discussed/provided to patient/family: Yes              Name of person given brochure if not patient: self              Relationship to patient: self    ED Medications:   Medications   nitroGLYcerin (NITROSTAT) sublingual tablet 0.4 mg (has no administration in time range)   aspirin (ASA) chewable tablet 324 mg (324 mg Oral Given 9/4/19 1925)       Drips infusing?:  " No    For the majority of the shift this patient was Green.   Interventions performed were frequent rounding.    Severe Sepsis OR Septic Shock Diagnosis Present: No    To be done/followed up on inpatient unit:  continue to monitor    ED NURSE PHONE NUMBER: *[phone]

## 2019-09-05 NOTE — PROGRESS NOTES
RECEIVING UNIT ED HANDOFF REVIEW    ED Nurse Handoff Report was reviewed by: Ramona Gutierrez RN on September 4, 2019 at 10:00 PM

## 2019-09-30 ENCOUNTER — HEALTH MAINTENANCE LETTER (OUTPATIENT)
Age: 64
End: 2019-09-30

## 2020-06-25 ENCOUNTER — TRANSFERRED RECORDS (OUTPATIENT)
Dept: HEALTH INFORMATION MANAGEMENT | Facility: CLINIC | Age: 65
End: 2020-06-25

## 2020-07-14 NOTE — PROGRESS NOTES
Stephanie is a 64 year old No obstetric history on file. female who presents for annual exam.     Besides routine health maintenance, she has no other health concerns today .    HPI:  The patient's PCP is  Krys Johnson DO.    Just retired  Prior vag hyst so doesn't have a cervix and doesn't need paps  Has some granulation tissue at apex but not symptomatic from it    Had BILATERAL SALPINGO-OOPHORECTOMY also   Is on htn med now  pcp checks her labs            GYNECOLOGIC HISTORY:    No LMP recorded. Patient is postmenopausal.      Her current contraception method is: hysterectomy.  She  reports that she has never smoked. She has never used smokeless tobacco.    Patient is sexually active.  STD testing offered?  Declined  Last PHQ-9 score on record = No flowsheet data found.  Last GAD7 score on record = No flowsheet data found.  Alcohol Score = 1    HEALTH MAINTENANCE:  Cholesterol: labs has done pcp   Cholesterol   Date Value Ref Range Status   2019 183 <200 mg/dL Final      Last Mammo: 2020, Result: Normal, Next Mammo:   Pap: Unknown   Colonoscopy:  2006, Result: Normal, Next Colonoscopy: 2020 years.  Dexa:  NA    Health maintenance updated:  yes    HISTORY:  OB History    Para Term  AB Living   2 2 2 0 0 2   SAB TAB Ectopic Multiple Live Births   0 0 0 0 2      # Outcome Date GA Lbr Erlin/2nd Weight Sex Delivery Anes PTL Lv   2 Term     F    MARIA DEL CARMEN   1 Term     F    MARIA DEL CARMEN       Patient Active Problem List   Diagnosis     Chest pain     Past Surgical History:   Procedure Laterality Date     C VAGINAL HYSTERECTOMY      with BSO     COLPORRHAPHY ANTERIOR  2008     SLING TRANSVAGINAL  2008    Mini Arc      Social History     Tobacco Use     Smoking status: Never Smoker     Smokeless tobacco: Never Used   Substance Use Topics     Alcohol use: Yes           Current Outpatient Medications   Medication Sig     fish oil-omega-3 fatty acids 1000 MG capsule Take 1 g by mouth  "daily     losartan (COZAAR) 50 MG tablet Take 100 mg by mouth At Bedtime      multivitamin w/minerals (THERA-VIT-M) tablet Take 1 tablet by mouth daily     No current facility-administered medications for this visit.      No Known Allergies    Past medical, surgical, social and family histories were reviewed and updated in EPIC.    ROS:   12 point review of systems negative other than symptoms noted below or in the HPI.  No urinary frequency or dysuria, bladder or kidney problems    EXAM:  /74   Pulse 64   Ht 1.676 m (5' 6\")   Wt 88.9 kg (196 lb)   BMI 31.64 kg/m     BMI: Body mass index is 31.64 kg/m .    PHYSICAL EXAM:  Constitutional:   Appearance: Well nourished, well developed, alert, in no acute distress  Neck:  Lymph Nodes:  No lymphadenopathy present    Thyroid:  Gland size normal, nontender, no nodules or masses present  on palpation  Chest:  Respiratory Effort:  Breathing unlabored  Cardiovascular:    Heart: Auscultation:  Regular rate, normal rhythm, no murmurs present  Breasts: Inspection of Breasts:  No lymphadenopathy present., Palpation of Breasts and Axillae:  No masses present on palpation, no breast tenderness., Axillary Lymph Nodes:  No lymphadenopathy present. and No nodularity, asymmetry or nipple discharge bilaterally.  Gastrointestinal:   Abdominal Examination:  Abdomen nontender to palpation, tone normal without rigidity or guarding, no masses present, umbilicus without lesions   Liver and Spleen:  No hepatomegaly present, liver nontender to palpation    Hernias:  No hernias present  Lymphatic: Lymph Nodes:  No other lymphadenopathy present  Skin:  General Inspection:  No rashes present, no lesions present, no areas of  discoloration  Neurologic:    Mental Status:  Oriented X3.  Normal strength and tone, sensory exam                grossly normal, mentation intact and speech normal.    Psychiatric:   Mentation appears normal and affect normal/bright.         Pelvic Exam:  External " Genitalia:     Normal appearance for age, no discharge present, no tenderness present, no inflammatory lesions present, color normal  Vagina:     Normal vaginal vault without central or paravaginal defects, no discharge present, no inflammatory lesions present, no masses present  Bladder:     Nontender to palpation  Urethra:   Urethral Body:  Urethra palpation normal, urethra structural support normal   Urethral Meatus:  No erythema or lesions present  Cervix:     Surgically absent  Uterus:     Surgically absent  Adnexa:     Surgically absent  Perineum:     Perineum within normal limits, no evidence of trauma, no rashes or skin lesions present  Anus:     Anus within normal limits, no hemorrhoids present  Inguinal Lymph Nodes:     No lymphadenopathy present    COUNSELING:   Reviewed preventive health counseling, as reflected in patient instructions       Regular exercise       Healthy diet/nutrition    BMI: Body mass index is 31.64 kg/m .  Weight management plan: Discussed healthy diet and exercise guidelines    ASSESSMENT:  64 year old female with satisfactory annual exam.    ICD-10-CM    1. Encounter for gynecological examination without abnormal finding  Z01.419 Pap imaged thin layer screen with HPV - recommended age 30 - 65     HPV High Risk Types DNA Cervical       PLAN:  rec Vit D  rec walk daily  No further paps needed  Still do mammogram each years      Sofi Serra MD

## 2020-07-15 ENCOUNTER — OFFICE VISIT (OUTPATIENT)
Dept: OBGYN | Facility: CLINIC | Age: 65
End: 2020-07-15
Payer: COMMERCIAL

## 2020-07-15 VITALS
HEART RATE: 64 BPM | SYSTOLIC BLOOD PRESSURE: 118 MMHG | HEIGHT: 66 IN | WEIGHT: 196 LBS | BODY MASS INDEX: 31.5 KG/M2 | DIASTOLIC BLOOD PRESSURE: 74 MMHG

## 2020-07-15 DIAGNOSIS — Z01.419 ENCOUNTER FOR GYNECOLOGICAL EXAMINATION WITHOUT ABNORMAL FINDING: Primary | ICD-10-CM

## 2020-07-15 PROCEDURE — 99386 PREV VISIT NEW AGE 40-64: CPT | Performed by: OBSTETRICS & GYNECOLOGY

## 2020-07-15 ASSESSMENT — MIFFLIN-ST. JEOR: SCORE: 1460.8

## 2021-01-15 ENCOUNTER — HEALTH MAINTENANCE LETTER (OUTPATIENT)
Age: 66
End: 2021-01-15

## 2021-08-29 ENCOUNTER — HEALTH MAINTENANCE LETTER (OUTPATIENT)
Age: 66
End: 2021-08-29

## 2021-10-24 ENCOUNTER — HEALTH MAINTENANCE LETTER (OUTPATIENT)
Age: 66
End: 2021-10-24

## 2022-10-15 ENCOUNTER — HEALTH MAINTENANCE LETTER (OUTPATIENT)
Age: 67
End: 2022-10-15

## 2022-12-03 ENCOUNTER — HEALTH MAINTENANCE LETTER (OUTPATIENT)
Age: 67
End: 2022-12-03

## 2024-01-07 ENCOUNTER — HEALTH MAINTENANCE LETTER (OUTPATIENT)
Age: 69
End: 2024-01-07